# Patient Record
(demographics unavailable — no encounter records)

---

## 2024-11-16 NOTE — ASSESSMENT
[FreeTextEntry1] : 70 yo RHM w/ h/o HTN, PE on Xarelto, morbid obesity s/p gastric bypass and LE lymphatic debulking, CIM after prolonged hospitalization for sepsis w/ residual distal RLE mononeuropathy, b/l moderately severe CTS reluctant for CTR currently doing well with Lyrica for neuropathic pain.  Currently has LROM with worsening shoulder arthralgias likely secondary to arthropathy recommend start PT and obtain orthopedic evaluation.  .  Recommendations: - continue wrist splints bilaterally if possible - Continue Lyrica 150 mg QD - PT for L shoulder arthropathy - consider orthopedic evaluation with shoulder specialist - core exercises/posture control - RTC in 6 months.

## 2024-11-16 NOTE — PHYSICAL EXAM
[FreeTextEntry1] : NAD. AOx3. Intact memory. Speech fluent, nondysarthric. CN 2 - 12 normal. Strength 5/5 b/l UE/LE, except hip flexion bilaterally 4+/5. LROM shoulders. APB 4+/4+. R DF 4+/5 chronic. NL tone, bulk. No abnl movements. DTRs 1+ UE, 1+ LE. Plantar response downgoing b/l. (-) Hoffmans, clonus. Sensory grossly intact. NL FTN/HKS. No dysdiadokinesia. Gait narrow based, no ataxia

## 2024-11-16 NOTE — HISTORY OF PRESENT ILLNESS
[FreeTextEntry1] : Since his last visit, Mr. Montalvo is doing well.  Has not had any significant deficits and continues to ambulate well without falls.  Still has numbness and occasional paresthesias in the RLE but no change in distribution or intensity.  Denies any  weakness or recurrent hand symptoms despite not wearing his wrist splints.  More recently has noted worsening pain with L shoulder rotation chronic but more severe and limiting.  Has not seen PT for shoulder arthropathy or followed up with orthopedics recently.  Is otherwise in his usual state of health.

## 2024-12-03 NOTE — HISTORY OF PRESENT ILLNESS
[FreeTextEntry1] : 72 year old male with lymphedema and large lymphatic masses in bilateral legs (thighs) s/p bilateral debulking. He is doing well, no complaints and able to move better and easier, wounds fully healed.  He had f/u at [facility] and workup for multiple myeloma was negative.

## 2024-12-03 NOTE — ASSESSMENT
[FreeTextEntry1] : 71 y/o M with venous insufficiency.  No open wounds or new leg swelling currently.  Advised compression therapy as tolerated.  Follow up in six months.

## 2024-12-18 NOTE — HISTORY OF PRESENT ILLNESS
[FreeTextEntry1] : 72 year old male with lymphedema and large lymphatic masses in bilateral legs (thighs) s/p bilateral debulking. He is doing well no complaints and able to move better and easier. wounds fully healed.  The patient complains of some mild lower extremity edema presents for a second opinion on his left thigh mass.

## 2024-12-18 NOTE — DATA REVIEWED
[FreeTextEntry1] : Venous duplex reviwed for 3 months ago  right there is no evidence of acute deep venous thrombosis.  All major veins are patent and compressible with normal spectral waveform analysis.    Left there is no evidence of acute deep venous thrombosis superficial thrombophlebitis all major veins are patent and compressible with normal spectral Doppler waveform analysis.  There is a large echolucent mass in the lateral thigh and diameter of 2.7 to 3.5 cm suggesting a cyst.  today probe was held to the area and noted a cyst at lateral thigh

## 2024-12-18 NOTE — ASSESSMENT
[FreeTextEntry1] : Leg swelling. I informed the patient that this is a cystic lesion. No need for surgical intervention at this time,  follow up on 4 months to discuss further lymphatic mass surgery.\   I, Dr. Craig, personally performed the evaluation and management (E/M) services for this established patient who presents today with (a) new problem(s)/exacerbation of (an) existing condition(s). That E/M includes conducting the clinically appropriate interval history &/or exam, assessing all new/exacerbated conditions, and establishing a new plan of care. Today, my POLLY, Anayeli Angus was here to observe my evaluation and management service for this new problem/exacerbated condition and follow the plan of care established by me going forward.  Thank you for allowing me to participate in the care of your patient.   Sincerely,   Omari Craig MD, RPVI, FACS Associate Professor of Surgery , Vascular Fellowship Director of Limb Salvage Surgery [facility] [employer] at [facility]

## 2024-12-18 NOTE — PHYSICAL EXAM
[2+] : left 2+ [Ankle Swelling (On Exam)] : present [Ankle Swelling Bilaterally] : bilaterally  [Varicose Veins Of Lower Extremities] : bilaterally [Ankle Swelling On The Left] : moderate [] : bilaterally [Ankle Swelling On The Right] : mild [FreeTextEntry1] : soft left thigh mass moble

## 2025-05-22 NOTE — PHYSICAL EXAM
[FreeTextEntry1] : Mental status: Awake, alert and oriented x3.  Recent and remote memory intact.  Naming, repetition and comprehension intact.  Attention/concentration intact.  No dysarthria, no aphasia.  Fund of knowledge appropriate.   Cranial nerves: Pupils equally round and reactive to light, visual fields full, no nystagmus, extraocular muscles intact, V1 through V3 intact bilaterally and symmetric, face symmetric, hearing intact to finger rub, palate elevation symmetric, tongue was midline.   Motor:  Decreased ROM in b/l shoulder abduction, MRC grading 5/5 b/l shoulder abd, bicep, triceps, b/l ABP 4+/5, 5/5 LE.   strength 5/5.  Normal tone and bulk.  No abnormal movements. Slight Tinnels +   Sensation: Intact to light touch   Coordination: No dysmetria on finger-to-nose.  No dysdiadokinesia.   Reflexes: 1+ in bilateral UE/LE,. (-) Jimenez.   Gait: Narrow and steady, ambulates with cane. No ataxia.  Romberg negative

## 2025-05-22 NOTE — HISTORY OF PRESENT ILLNESS
[FreeTextEntry1] : 71 yo RHM w/ h/o HTN, PE on Xarelto, morbid obesity s/p gastric bypass and LE lymphatic debulking, CIM after prolonged hospitalization for sepsis w/ residual distal RLE mononeuropathy, b/l moderately severe CTS presenting for follow up.  Since last visit, patient reports he has been doing okay. He feels like his CTS is controlled and reports no issues. Does not drop items. He does not feel the need to wear the brace as much as he feels okay.  He has been going to PT for the shoulders which has helped but he would like to get a referral for ortho. In addition, he also feels like he could get PT referral on his neck. He feels like the PT really helps him a lot as he was approved for 3x weekly.   Otherwise, his legs are feeling okay, denies any falls. He walks with a cane for safety. Also, he states he supposed to have a "tummy tuck" later this year.

## 2025-05-22 NOTE — HISTORY OF PRESENT ILLNESS
[FreeTextEntry1] : 73 yo RHM w/ h/o HTN, PE on Xarelto, morbid obesity s/p gastric bypass and LE lymphatic debulking, CIM after prolonged hospitalization for sepsis w/ residual distal RLE mononeuropathy, b/l moderately severe CTS presenting for follow up.  Since last visit, patient reports he has been doing okay. He feels like his CTS is controlled and reports no issues. Does not drop items. He does not feel the need to wear the brace as much as he feels okay.  He has been going to PT for the shoulders which has helped but he would like to get a referral for ortho. In addition, he also feels like he could get PT referral on his neck. He feels like the PT really helps him a lot as he was approved for 3x weekly.   Otherwise, his legs are feeling okay, denies any falls. He walks with a cane for safety. Also, he states he supposed to have a "tummy tuck" later this year.

## 2025-05-22 NOTE — ASSESSMENT
[FreeTextEntry1] : 73 yo RHM w/ h/o HTN, PE on Xarelto, morbid obesity s/p gastric bypass and LE lymphatic debulking, CIM after prolonged hospitalization for sepsis w/ residual distal RLE mononeuropathy, b/l moderately severe CTS presenting for follow up. On exam, has mild b/l ABP weakness (4+/5) and decreased b/l shoulder abduction weakness. Will refer to ortho for eval and resend to PT as he had some benefits.   Plan: - Refill pregabalin 150mg daily - Ortho Referral - Dr. Valentin -  PT for cervicalgia - Follow up in 6 months

## 2025-06-09 NOTE — ASSESSMENT
[FreeTextEntry1] : Assessment: Bilateral glenohumeral arthritis  Plan: 1.  Clinical and radiographic findings were reviewed with the patient.  I reviewed his x-rays with him. 2.  I discussed the natural history of arthritis as well as treatment options including nonoperative versus operative options.  The patient's primary concern is limited range of motion.  He has minimal pain.  He has tried multiple courses of physical therapy without improvement.  I discussed operative treatment with him which would likely involve shoulder arthroplasty.  He is interested in exploring this further.  We have set him up for an appointment to see Dr. Smith. 3.  Plan of care discussed with the patient and he is in agreement.  All questions were answered.  I encouraged him to reach out to the office with any questions or concerns.

## 2025-06-09 NOTE — HISTORY OF PRESENT ILLNESS
[de-identified] : The patient is a 72-year-old male who is here today for evaluation of his bilateral shoulders.  He is right-hand dominant.  He reports a long history of difficulty with range of motion in both shoulders.  He has done multiple courses of physical therapy without improvement.  He has no pain in his shoulders.  His primary concern is limited range of motion.  Past medical/surgical history: 2 knee replacements, bariatric surgery, partial kidney problem, hernia surgery  Current medications: See list  Allergies: None  Family history: Noncontributory  Social history:  Occasional alcohol use Denies tobacco use Denies recreational drug use  Review of systems: A 10 point review of systems was positive for neck pain, otherwise unremarkable  The patient is well-appearing.  His height is 5 feet 10 and his weight is 245 pounds.  Examination of the right shoulder demonstrates intact skin.  No tenderness to palpation around the shoulder.  Active shoulder abduction to about 40 degrees.  Passive shoulder abduction to about 80 degrees.  Positive Neer's and Lucero.  Negative cross body adduction.  3-4 out of 5 strength with resisted rotator cuff testing.  Neurovascularly intact distally.  Examination of the left shoulder demonstrates intact skin.  No tenderness to palpation around the shoulder.  Active shoulder abduction to 110 degrees.  Positive Neer's and Lucero.  Negative cross body adduction.  4 out of 5 strength with resisted rotator cuff strength testing.  Neurovascularly intact distally.  Bilateral shoulder x-rays taken in the office today were personally reviewed, demonstrating right rotator cuff arthropathy with near complete loss of the acromiohumeral space.  Inferior humeral head osteophytes.  End-stage degenerative changes of the left glenohumeral joint

## 2025-07-08 NOTE — ASSESSMENT
[FreeTextEntry1] : 71 y/o M with venous insufficiency and lymphedema. No open wounds or new leg swelling currently. Healed incisions on bilateral medial thighs.  Duplex showed no evidence of DVT in the LE bilaterally.  F/u in 3 months.   I, Dr. Krunal Lopes, personally performed the evaluation and management (E/M) services for this established patient who presents today with (a) new problem(s)/exacerbation of (an) existing condition(s). That E/M includes conducting the clinically appropriate interval history &/or exam, assessing all new/exacerbated conditions, and establishing a new plan of care. Today, my POLLY, Portia Andino PA-C, was here to observe my evaluation and management service for this new problem/exacerbated condition and follow the plan of care established by me going forward.

## 2025-07-08 NOTE — HISTORY OF PRESENT ILLNESS
[FreeTextEntry1] : 72 year old male with lymphedema and large lymphatic masses in bilateral legs (thighs) s/p bilateral debulking. He is doing well no complaints and able to move better and easier. wounds fully healed.  The patient complains of some mild lower extremity edema

## 2025-07-08 NOTE — DATA REVIEWED
[FreeTextEntry1] : Venous duplex right there is no evidence of acute deep venous thrombosis.  All major veins are patent and compressible with normal spectral waveform analysis.    Left there is no evidence of acute deep venous thrombosis superficial thrombophlebitis all major veins are patent and compressible with normal spectral Doppler waveform analysis.  There is an echolucent mass in the lateral thigh and diameter of 2.7 to 3.5 cm suggesting a cyst.

## 2025-07-20 NOTE — HISTORY OF PRESENT ILLNESS
[de-identified] : Patient is here for evaluation of gerald shoulder pain, left now worse Affecting quality of life Has pain and weakness with loss of rom Wakes up at night due to pain  NAD gerald shoulder: TTP ant GH joint, bicipital groove FF 0-120 with pain and crepitus Pain with terminal rom Weakness to abduction and ER Pos Impingement Pos Escobar Pos Cross Arm Adduction Negative instability Positive scapula dyskinesia  XRay Left shoulder adv GH arthritis, right shoulder RC arthropathy  Plan went over findings explained the imaging and tx options op vs nonop explained explained oa and shoulder arthroplasty will proceed with left shoulder reverse arthroplasty will get preop ct scan for planning  Operative and nonoperative options discussed with patient. Surgical risks, benefits, and alternatives explained. Surgical risks include but are not exclusive to bleeding, infection, neurovascular damage, continued pain, stiffness, scarring, rsd, dvt/pe, potential failure of surgery that may require further surgery in the future. I went over incisions and rehabilitation. All questions answered.